# Patient Record
Sex: FEMALE | Race: ASIAN | NOT HISPANIC OR LATINO | URBAN - METROPOLITAN AREA
[De-identification: names, ages, dates, MRNs, and addresses within clinical notes are randomized per-mention and may not be internally consistent; named-entity substitution may affect disease eponyms.]

---

## 2019-05-23 ENCOUNTER — NEW PATIENT (OUTPATIENT)
Dept: URBAN - METROPOLITAN AREA CLINIC 30 | Facility: CLINIC | Age: 74
End: 2019-05-23
Payer: MEDICARE

## 2019-05-23 PROCEDURE — 92015 DETERMINE REFRACTIVE STATE: CPT | Performed by: OPTOMETRIST

## 2019-05-23 PROCEDURE — 92004 COMPRE OPH EXAM NEW PT 1/>: CPT | Performed by: OPTOMETRIST

## 2019-05-23 PROCEDURE — 92134 CPTRZ OPH DX IMG PST SGM RTA: CPT | Performed by: OPTOMETRIST

## 2019-05-23 ASSESSMENT — INTRAOCULAR PRESSURE
OD: 14
OS: 13

## 2019-05-23 ASSESSMENT — VISUAL ACUITY
OS: 20/30
OD: 20/25

## 2019-05-23 ASSESSMENT — KERATOMETRY
OD: 42.19
OS: 42.27

## 2020-11-19 ENCOUNTER — FOLLOW UP ESTABLISHED (OUTPATIENT)
Dept: URBAN - METROPOLITAN AREA CLINIC 30 | Facility: CLINIC | Age: 75
End: 2020-11-19
Payer: MEDICARE

## 2020-11-19 DIAGNOSIS — E11.9 TYPE 2 DIABETES MELLITUS WITHOUT COMPLICATIONS: ICD-10-CM

## 2020-11-19 DIAGNOSIS — Z79.84 COMBINED FORMS OF AGE-RELATED CATARACT, BILATERAL: ICD-10-CM

## 2020-11-19 PROCEDURE — 92014 COMPRE OPH EXAM EST PT 1/>: CPT | Performed by: OPTOMETRIST

## 2020-11-19 PROCEDURE — 92250 FUNDUS PHOTOGRAPHY W/I&R: CPT | Performed by: OPTOMETRIST

## 2020-11-19 ASSESSMENT — VISUAL ACUITY
OS: 20/30
OD: 20/CF 4'

## 2020-11-19 ASSESSMENT — INTRAOCULAR PRESSURE
OD: 15
OS: 17

## 2020-11-19 ASSESSMENT — KERATOMETRY
OS: 42.22
OD: 41.92

## 2020-11-24 ENCOUNTER — NEW PATIENT (OUTPATIENT)
Dept: URBAN - METROPOLITAN AREA CLINIC 10 | Facility: CLINIC | Age: 75
End: 2020-11-24
Payer: MEDICARE

## 2020-11-24 PROCEDURE — 67028 INJECTION EYE DRUG: CPT | Performed by: OPHTHALMOLOGY

## 2020-11-24 PROCEDURE — 92004 COMPRE OPH EXAM NEW PT 1/>: CPT | Performed by: OPHTHALMOLOGY

## 2020-11-24 ASSESSMENT — INTRAOCULAR PRESSURE
OD: 12
OS: 15

## 2020-12-22 ENCOUNTER — FOLLOW UP ESTABLISHED (OUTPATIENT)
Dept: URBAN - METROPOLITAN AREA CLINIC 10 | Facility: CLINIC | Age: 75
End: 2020-12-22
Payer: MEDICARE

## 2020-12-22 PROCEDURE — 67028 INJECTION EYE DRUG: CPT | Performed by: OPHTHALMOLOGY

## 2020-12-22 PROCEDURE — 92250 FUNDUS PHOTOGRAPHY W/I&R: CPT | Performed by: OPHTHALMOLOGY

## 2020-12-22 PROCEDURE — 92242 FLUORESCEIN&ICG ANGIOGRAPHY: CPT | Performed by: OPHTHALMOLOGY

## 2020-12-22 ASSESSMENT — INTRAOCULAR PRESSURE
OS: 12
OD: 11

## 2021-01-25 ENCOUNTER — FOLLOW UP ESTABLISHED (OUTPATIENT)
Dept: URBAN - METROPOLITAN AREA CLINIC 30 | Facility: CLINIC | Age: 76
End: 2021-01-25
Payer: MEDICARE

## 2021-01-25 PROCEDURE — 92134 CPTRZ OPH DX IMG PST SGM RTA: CPT | Performed by: OPHTHALMOLOGY

## 2021-01-25 PROCEDURE — 67028 INJECTION EYE DRUG: CPT | Performed by: OPHTHALMOLOGY

## 2021-01-25 ASSESSMENT — INTRAOCULAR PRESSURE
OD: 15
OS: 14

## 2021-03-08 ENCOUNTER — FOLLOW UP ESTABLISHED (OUTPATIENT)
Dept: URBAN - METROPOLITAN AREA CLINIC 30 | Facility: CLINIC | Age: 76
End: 2021-03-08
Payer: MEDICARE

## 2021-03-08 PROCEDURE — 99214 OFFICE O/P EST MOD 30 MIN: CPT | Performed by: OPHTHALMOLOGY

## 2021-03-08 PROCEDURE — 92134 CPTRZ OPH DX IMG PST SGM RTA: CPT | Performed by: OPHTHALMOLOGY

## 2021-03-08 PROCEDURE — 67515 INJECT/TREAT EYE SOCKET: CPT | Performed by: OPHTHALMOLOGY

## 2021-03-08 ASSESSMENT — INTRAOCULAR PRESSURE
OS: 23
OD: 21

## 2021-04-19 ENCOUNTER — OFFICE VISIT (OUTPATIENT)
Dept: URBAN - METROPOLITAN AREA CLINIC 30 | Facility: CLINIC | Age: 76
End: 2021-04-19
Payer: MEDICARE

## 2021-04-19 PROCEDURE — 92235 FLUORESCEIN ANGRPH MLTIFRAME: CPT | Performed by: OPHTHALMOLOGY

## 2021-04-19 PROCEDURE — 67028 INJECTION EYE DRUG: CPT | Performed by: OPHTHALMOLOGY

## 2021-04-19 PROCEDURE — 92134 CPTRZ OPH DX IMG PST SGM RTA: CPT | Performed by: OPHTHALMOLOGY

## 2021-04-19 PROCEDURE — 92250 FUNDUS PHOTOGRAPHY W/I&R: CPT | Performed by: OPHTHALMOLOGY

## 2021-04-19 ASSESSMENT — INTRAOCULAR PRESSURE
OD: 15
OS: 15

## 2021-04-19 NOTE — IMPRESSION/PLAN
Impression: Central  vein occl w CME OD - CRVO massive HMG / CME -- [facility] loss Nov '20 - injx imprv'd Plan: hx: [[Ischemic CRVO. SEVERE CRVO OD - massive HMG / CME --[facility] loss Nov '20  HMG/fluid - imprv'd w injx]]. URGED BP care. TODAY Avstn OD inj (proc note)     RTC 6w ND/inj/OCT plan Avastin OD    Future  Exam ?

## 2021-06-07 ENCOUNTER — OFFICE VISIT (OUTPATIENT)
Dept: URBAN - METROPOLITAN AREA CLINIC 24 | Facility: CLINIC | Age: 76
End: 2021-06-07
Payer: MEDICARE

## 2021-06-07 PROCEDURE — 92134 CPTRZ OPH DX IMG PST SGM RTA: CPT | Performed by: OPHTHALMOLOGY

## 2021-06-07 PROCEDURE — 67028 INJECTION EYE DRUG: CPT | Performed by: OPHTHALMOLOGY

## 2021-06-07 ASSESSMENT — INTRAOCULAR PRESSURE
OS: 18
OD: 19

## 2021-06-07 NOTE — IMPRESSION/PLAN
Impression: Central  vein occl w CME OD - CRVO massive HMG / CME -- [facility] loss Nov '20 - injx imprv'd Plan: hx: [[Ischemic CRVO. SEVERE CRVO OD - massive HMG / CME --[facility] loss Nov '20  HMG/fluid - imprv'd w injx --  URGED BP care]]. TODAY Avstn OD inj (proc note) RTC 6-7w dil, OCT, eval - h/o Avastin OD    Future HRA?

## 2021-06-11 ENCOUNTER — OFFICE VISIT (OUTPATIENT)
Dept: URBAN - METROPOLITAN AREA CLINIC 30 | Facility: CLINIC | Age: 76
End: 2021-06-11
Payer: MEDICARE

## 2021-06-11 DIAGNOSIS — H25.813 COMBINED FORMS OF AGE-RELATED CATARACT, BILATERAL: ICD-10-CM

## 2021-06-11 DIAGNOSIS — H40.033 ANATOMICAL NARROW ANGLE, BILATERAL: ICD-10-CM

## 2021-06-11 DIAGNOSIS — H34.8110 CENTRAL RETINAL VEIN OCCLUSION, RIGHT EYE, WITH MACULAR EDEMA: ICD-10-CM

## 2021-06-11 PROCEDURE — 92014 COMPRE OPH EXAM EST PT 1/>: CPT | Performed by: OPTOMETRIST

## 2021-06-11 ASSESSMENT — INTRAOCULAR PRESSURE
OS: 18
OD: 17

## 2021-06-11 ASSESSMENT — KERATOMETRY
OS: 42.45
OD: 42.08

## 2021-06-11 ASSESSMENT — VISUAL ACUITY
OS: 20/40
OD: 20/60

## 2021-06-11 NOTE — IMPRESSION/PLAN
Impression: Diabetes mellitus without complications Plan: Diabetes type II: no background retinopathy. Hx of CRVO OD related to HTN. Emphasized importance of strict BS control, diet, exercise, and BP control to avoid risk of loss of vision. Current A1C unknown per pt.

## 2021-06-11 NOTE — IMPRESSION/PLAN
Impression: Hypertensive retinopathy, bilateral: H35.033. Plan: ALWAYS check BP. Stable today. Under care of PCP.

## 2021-06-11 NOTE — IMPRESSION/PLAN
Impression: Central  vein occl w CME OD - CRVO massive HMG / CME -- [facility] loss Nov '20 - injx imprv'd Plan: hx: [[Ischemic CRVO. SEVERE CRVO OD - massive HMG / CME --[facility] loss Nov '20  HMG/fluid - imprv'd w injx --  URGED BP care]]. Under care of Dr Cesario Contreras. Ongoing Avastin injections. Fundus appears stable. Follow up as sched. BCVA limited.

## 2021-07-26 ENCOUNTER — OFFICE VISIT (OUTPATIENT)
Dept: URBAN - METROPOLITAN AREA CLINIC 30 | Facility: CLINIC | Age: 76
End: 2021-07-26
Payer: MEDICARE

## 2021-07-26 PROCEDURE — 99214 OFFICE O/P EST MOD 30 MIN: CPT | Performed by: OPHTHALMOLOGY

## 2021-07-26 PROCEDURE — 67028 INJECTION EYE DRUG: CPT | Performed by: OPHTHALMOLOGY

## 2021-07-26 PROCEDURE — 92134 CPTRZ OPH DX IMG PST SGM RTA: CPT | Performed by: OPHTHALMOLOGY

## 2021-07-26 ASSESSMENT — INTRAOCULAR PRESSURE
OD: 19
OS: 20

## 2021-07-26 NOTE — IMPRESSION/PLAN
Impression: Hypertensive retinopathy HTN Plan: ALWAYS check BP   - HTN is a concern.   URGED efforts / lower salt / counseled pt
  TODAY exam shows HTN chng OS but no hmg

## 2021-07-26 NOTE — IMPRESSION/PLAN
Impression: Central CRVO w CME OD -Massive HMG/CME x Nov '20 - inj imprv'd Plan: hx: [[Ischemic CRVO. SEVERE CRVO OD - massive HMG / CME --[facility] loss Nov '20  HMG/fluid - imprv'd w injx --  URGED BP care]]. TODAY Avstn OD inj (proc note) RTC 8-9w extend / FA update and plan Avastin OD    Future ND?

## 2021-10-05 ENCOUNTER — OFFICE VISIT (OUTPATIENT)
Dept: URBAN - METROPOLITAN AREA CLINIC 10 | Facility: CLINIC | Age: 76
End: 2021-10-05
Payer: MEDICARE

## 2021-10-05 DIAGNOSIS — H35.033 HYPERTENSIVE RETINOPATHY, BILATERAL: ICD-10-CM

## 2021-10-05 PROCEDURE — 92250 FUNDUS PHOTOGRAPHY W/I&R: CPT | Performed by: OPHTHALMOLOGY

## 2021-10-05 PROCEDURE — 67028 INJECTION EYE DRUG: CPT | Performed by: OPHTHALMOLOGY

## 2021-10-05 PROCEDURE — 92235 FLUORESCEIN ANGRPH MLTIFRAME: CPT | Performed by: OPHTHALMOLOGY

## 2021-10-05 PROCEDURE — 92134 CPTRZ OPH DX IMG PST SGM RTA: CPT | Performed by: OPHTHALMOLOGY

## 2021-10-05 ASSESSMENT — INTRAOCULAR PRESSURE
OD: 19
OS: 17

## 2021-10-05 NOTE — IMPRESSION/PLAN
Impression: Central CRVO w CME OD -Massive HMG/CME x Nov '20 - inj imprv'd Plan: hx: [[Ischemic CRVO. SEVERE CRVO OD - massive HMG / CME --[facility] loss Nov '20  HMG/fluid - imprv'd w injx --  URGED BP care]]. TODAY Avstn OD inj (proc note)      RTC 8-9w extend / ND/inj/pos OCT - plan Avastin OD    Future  exam?

## 2021-10-05 NOTE — IMPRESSION/PLAN
Impression: Hypertensive retinopathy HTN Plan: ALWAYS check BP   - HTN is a concern.   URGED efforts / lower salt / counseled pt
  TODAY FA reveals HTN chng OS but no hmg

## 2021-12-13 ENCOUNTER — OFFICE VISIT (OUTPATIENT)
Dept: URBAN - METROPOLITAN AREA CLINIC 30 | Facility: CLINIC | Age: 76
End: 2021-12-13
Payer: MEDICARE

## 2021-12-13 PROCEDURE — 92134 CPTRZ OPH DX IMG PST SGM RTA: CPT | Performed by: OPHTHALMOLOGY

## 2021-12-13 PROCEDURE — 67028 INJECTION EYE DRUG: CPT | Performed by: OPHTHALMOLOGY

## 2021-12-13 ASSESSMENT — INTRAOCULAR PRESSURE
OD: 18
OS: 19

## 2021-12-13 NOTE — IMPRESSION/PLAN
Impression: Central CRVO w CME OD -Massive HMG/CME x Nov '20 - inj imprv'd Plan: hx: [[Ischemic CRVO. SEVERE CRVO OD - massive HMG / CME --[facility] loss Nov '20  HMG/fluid - imprv'd w injx --  URGED BP care]]. TODAY Avstn OD inj (proc note) RTC 8-9w extend /  dil, OCT, eval - h/o  Avastin OD    Future  HRA?

## 2022-02-07 ENCOUNTER — OFFICE VISIT (OUTPATIENT)
Dept: URBAN - METROPOLITAN AREA CLINIC 30 | Facility: CLINIC | Age: 77
End: 2022-02-07
Payer: MEDICARE

## 2022-02-07 DIAGNOSIS — H25.13 AGE-RELATED NUCLEAR CATARACT, BILATERAL: ICD-10-CM

## 2022-02-07 PROCEDURE — 99214 OFFICE O/P EST MOD 30 MIN: CPT | Performed by: OPHTHALMOLOGY

## 2022-02-07 PROCEDURE — 92134 CPTRZ OPH DX IMG PST SGM RTA: CPT | Performed by: OPHTHALMOLOGY

## 2022-02-07 PROCEDURE — 67028 INJECTION EYE DRUG: CPT | Performed by: OPHTHALMOLOGY

## 2022-02-07 ASSESSMENT — INTRAOCULAR PRESSURE
OS: 11
OD: 14

## 2022-02-07 NOTE — IMPRESSION/PLAN
Impression: Hypertensive retinopathy HTN Plan:   TODAY exam shows HTN chng OS but no hmg 
  ALWAYS check BP   - HTN is a concern. URGED efforts / lower salt / d/w'd  pt

## 2022-02-07 NOTE — IMPRESSION/PLAN
Impression: Central CRVO w CME OD -Massive HMG/CME x Nov '20 - inj imprv'd Plan: hx: [[Ischemic CRVO. SEVERE CRVO OD - massive HMG / CME --[facility] loss Nov '20  HMG/fluid - imprv'd w injx --  URGED BP care]]. TODAY Avstn OD inj (proc note) RTC 8-9w extend Pos HRA / plan Avastin OD    Future  ND?

## 2022-04-04 ENCOUNTER — OFFICE VISIT (OUTPATIENT)
Dept: URBAN - METROPOLITAN AREA CLINIC 30 | Facility: CLINIC | Age: 77
End: 2022-04-04
Payer: MEDICARE

## 2022-04-04 PROCEDURE — 92250 FUNDUS PHOTOGRAPHY W/I&R: CPT | Performed by: OPHTHALMOLOGY

## 2022-04-04 PROCEDURE — 92242 FLUORESCEIN&ICG ANGIOGRAPHY: CPT | Performed by: OPHTHALMOLOGY

## 2022-04-04 PROCEDURE — 67028 INJECTION EYE DRUG: CPT | Performed by: OPHTHALMOLOGY

## 2022-04-04 PROCEDURE — 92134 CPTRZ OPH DX IMG PST SGM RTA: CPT | Performed by: OPHTHALMOLOGY

## 2022-04-04 ASSESSMENT — INTRAOCULAR PRESSURE
OS: 21
OD: 20

## 2022-04-04 NOTE — IMPRESSION/PLAN
Impression: Central CRVO w CME OD -Massive HMG/CME x Nov '20 - inj imprv'd Plan: hx: [[Ischemic CRVO. SEVERE CRVO OD - massive HMG / CME --[facility] loss Nov '20  HMG/fluid - imprv'd w injx --  URGED BP care]]. TODAY Avstn OD inj (proc note)      RTC 8-9w extend ND/injx/OCT  plan Avastin OD    Future  Exam?

## 2022-06-06 ENCOUNTER — OFFICE VISIT (OUTPATIENT)
Dept: URBAN - METROPOLITAN AREA CLINIC 24 | Facility: CLINIC | Age: 77
End: 2022-06-06
Payer: MEDICARE

## 2022-06-06 DIAGNOSIS — H34.8110 CENTRAL RETINAL VEIN OCCLUSION, RIGHT EYE, WITH MACULAR EDEMA: Primary | ICD-10-CM

## 2022-06-06 PROCEDURE — 67028 INJECTION EYE DRUG: CPT | Performed by: OPHTHALMOLOGY

## 2022-06-06 PROCEDURE — 92134 CPTRZ OPH DX IMG PST SGM RTA: CPT | Performed by: OPHTHALMOLOGY

## 2022-06-06 ASSESSMENT — INTRAOCULAR PRESSURE
OS: 15
OD: 15

## 2022-06-06 NOTE — IMPRESSION/PLAN
Impression: Central CRVO w CME OD -Massive HMG/CME x Nov '20 - inj imprv'd Plan: hx: [[Ischemic CRVO. SEVERE CRVO OD - massive HMG / CME --[facility] loss Nov '20  HMG/fluid - imprv'd w injx --  URGED BP care]]. TODAY Avstn OD inj (proc note) RTC 8-9w extend dil, OCT, eval - h/o Avastin OD    Future  HRA? 
    Daughter Sarmad Hill sometimes there from [place] to help her mom [place]

## 2022-08-08 ENCOUNTER — OFFICE VISIT (OUTPATIENT)
Dept: URBAN - METROPOLITAN AREA CLINIC 30 | Facility: CLINIC | Age: 77
End: 2022-08-08
Payer: MEDICARE

## 2022-08-08 DIAGNOSIS — H35.033 HYPERTENSIVE RETINOPATHY, BILATERAL: ICD-10-CM

## 2022-08-08 DIAGNOSIS — H34.8110 CENTRAL RETINAL VEIN OCCLUSION, RIGHT EYE, WITH MACULAR EDEMA: Primary | ICD-10-CM

## 2022-08-08 PROCEDURE — 67028 INJECTION EYE DRUG: CPT | Performed by: OPHTHALMOLOGY

## 2022-08-08 PROCEDURE — 92134 CPTRZ OPH DX IMG PST SGM RTA: CPT | Performed by: OPHTHALMOLOGY

## 2022-08-08 PROCEDURE — 99214 OFFICE O/P EST MOD 30 MIN: CPT | Performed by: OPHTHALMOLOGY

## 2022-08-08 ASSESSMENT — INTRAOCULAR PRESSURE
OS: 20
OD: 18

## 2022-08-08 NOTE — IMPRESSION/PLAN
Impression: Central CRVO w CME OD -Massive HMG/CME x Nov '20 - inj imprv'd Plan: hx: [[Ischemic CRVO. SEVERE CRVO OD - massive HMG / CME --[facility] loss Nov '20  HMG/fluid - imprv'd w injx --  URGED BP care]]. TODAY Avstn OD inj (proc note) RTC 8-9w extend pos HRA / plan Avastin OD    Future ND?  
    Daughter Brooke Winter sometimes there from [place] to help her mom [place]

## 2022-08-08 NOTE — IMPRESSION/PLAN
Impression: Hypertensive retinopathy HTN Plan: TODAY repeated exam shows HTN chng OS but no hmg 
  ALWAYS check BP   - HTN is a concern.   URGED efforts / lower salt / d/w'd  pt

## 2022-10-03 ENCOUNTER — OFFICE VISIT (OUTPATIENT)
Dept: URBAN - METROPOLITAN AREA CLINIC 30 | Facility: CLINIC | Age: 77
End: 2022-10-03
Payer: MEDICARE

## 2022-10-03 DIAGNOSIS — H34.8110 CENTRAL RETINAL VEIN OCCLUSION, RIGHT EYE, WITH MACULAR EDEMA: Primary | ICD-10-CM

## 2022-10-03 DIAGNOSIS — H35.033 HYPERTENSIVE RETINOPATHY, BILATERAL: ICD-10-CM

## 2022-10-03 PROCEDURE — 67028 INJECTION EYE DRUG: CPT | Performed by: OPHTHALMOLOGY

## 2022-10-03 PROCEDURE — 92134 CPTRZ OPH DX IMG PST SGM RTA: CPT | Performed by: OPHTHALMOLOGY

## 2022-10-03 PROCEDURE — 92242 FLUORESCEIN&ICG ANGIOGRAPHY: CPT | Performed by: OPHTHALMOLOGY

## 2022-10-03 PROCEDURE — 92250 FUNDUS PHOTOGRAPHY W/I&R: CPT | Performed by: OPHTHALMOLOGY

## 2022-10-03 ASSESSMENT — INTRAOCULAR PRESSURE
OD: 13
OS: 15

## 2022-10-03 NOTE — IMPRESSION/PLAN
Impression: Hypertensive retinopathy HTN Plan: TODAY repeated FA w HTN chng OS but no hmg 
  ALWAYS check BP   - HTN is a concern.   URGED efforts / lower salt / d/w'd  pt

## 2022-10-03 NOTE — IMPRESSION/PLAN
Impression: Central CRVO w CME OD -Massive HMG/CME x Nov '20 - inj imprv'd Plan: hx: [[Ischemic CRVO. SEVERE CRVO OD - massive HMG / CME --[facility] loss Nov '20  HMG/fluid - imprv'd w injx --  URGED BP care]]. TODAY Avstn OD inj (proc note)      RTC 8-10w extend ND/inj/pos OCT - plan Avastin OD    Future exam?  
    Daughter RAZIA sometimes there from [place] to help her mom [place]

## 2023-05-18 ENCOUNTER — OFFICE VISIT (OUTPATIENT)
Dept: URBAN - METROPOLITAN AREA CLINIC 30 | Facility: CLINIC | Age: 78
End: 2023-05-18
Payer: MEDICARE

## 2023-05-18 DIAGNOSIS — H25.13 AGE-RELATED NUCLEAR CATARACT, BILATERAL: ICD-10-CM

## 2023-05-18 DIAGNOSIS — H40.023 OPEN ANGLE WITH BORDERLINE FINDINGS, HIGH RISK, BILATERAL: Primary | ICD-10-CM

## 2023-05-18 DIAGNOSIS — H34.8110 CENTRAL RETINAL VEIN OCCLUSION, RIGHT EYE, WITH MACULAR EDEMA: ICD-10-CM

## 2023-05-18 PROCEDURE — 99213 OFFICE O/P EST LOW 20 MIN: CPT

## 2023-05-18 PROCEDURE — 92020 GONIOSCOPY: CPT

## 2023-05-18 PROCEDURE — 92133 CPTRZD OPH DX IMG PST SGM ON: CPT

## 2023-05-18 PROCEDURE — 92083 EXTENDED VISUAL FIELD XM: CPT

## 2023-05-18 ASSESSMENT — INTRAOCULAR PRESSURE
OD: 20
OS: 21

## 2023-05-18 NOTE — IMPRESSION/PLAN
Impression: Central CRVO w CME OD -Massive HMG/CME x Nov '20 - inj improved Plan: hx: [[Ischemic CRVO. SEVERE CRVO OD - massive HMG / CME --[facility] loss Nov '20  HMG/fluid - imprv'd w injx --  URGED BP care]] Last HEATH 10/3/22 with [facility]. Got off cycle around 11/2022 - will f/u as scheduled with him. VA remains stable, no recurrence of fluid on OCT.

## 2023-05-18 NOTE — IMPRESSION/PLAN
Impression: Age-related nuclear cataract, bilateral: H25.13. Plan: VS, pt noting more issues with OD recently. Will re-establish care with [facility] next mo. Pending that apt, can consider to proceed with cataract evaluation and schedule surgery.

## 2023-05-18 NOTE — IMPRESSION/PLAN
Impression: Open angle with borderline findings, high risk, bilateral: H40.023. Plan: GS OD>>OS due to h/o CRVO OD, borderline IOP, OCT RNFL appearance. OCT RNFL 3/17/23: poor scan sup but diffuse inferior loss OD, tr IT thinning OS. Today, IOP 20/21 on no gtts OCT RNFL: OD IT loss 67, poor scan sup || OS poor scan Fundus photos taken today 10-2 HVF (intended to be 24-2): OD high FL, inferior misses || OS high FL, few inf misses. Gonio: no NVA, open OU PLAN:
Monitor w/o tx for change. If e/o progression on RNFL, would consider addition of gtts.  F/u 3 mo [facility] IOP dilate OCT RNFL/GCC + cat eval.

## 2023-06-12 ENCOUNTER — OFFICE VISIT (OUTPATIENT)
Dept: URBAN - METROPOLITAN AREA CLINIC 30 | Facility: CLINIC | Age: 78
End: 2023-06-12
Payer: MEDICARE

## 2023-06-12 DIAGNOSIS — H25.13 AGE-RELATED NUCLEAR CATARACT, BILATERAL: ICD-10-CM

## 2023-06-12 DIAGNOSIS — H34.8110 CENTRAL RETINAL VEIN OCCLUSION, RIGHT EYE, WITH MACULAR EDEMA: Primary | ICD-10-CM

## 2023-06-12 DIAGNOSIS — H35.033 HYPERTENSIVE RETINOPATHY, BILATERAL: ICD-10-CM

## 2023-06-12 PROCEDURE — 92250 FUNDUS PHOTOGRAPHY W/I&R: CPT | Performed by: OPHTHALMOLOGY

## 2023-06-12 PROCEDURE — 92134 CPTRZ OPH DX IMG PST SGM RTA: CPT | Performed by: OPHTHALMOLOGY

## 2023-06-12 PROCEDURE — 99214 OFFICE O/P EST MOD 30 MIN: CPT | Performed by: OPHTHALMOLOGY

## 2023-06-12 ASSESSMENT — INTRAOCULAR PRESSURE
OD: 17
OS: 16

## 2023-06-12 NOTE — IMPRESSION/PLAN
Impression: Central CRVO w CME OD -Massive HMG/CME x Nov '20 - inj imprv'd  (Sweet / grateful) Plan: hx: [[Ischemic CRVO. SEVERE CRVO OD - massive HMG / CME --[facility] loss Nov '20  HMG/fluid - imprv'd w injx --  URGED BP care]]. TODAY NO INJX / ATROPHY now OD w NO RECUR HMG '22-23 RTC q6mo -- COLORS / EXAM -- SUSPEND injx OD / monitor OS     Daughter Chata Alonzo sometimes there from [place] to help her mom [place]

## 2023-06-12 NOTE — IMPRESSION/PLAN
Impression: Hypertensive retinopathy HTN Plan: TODAY repeated Exam shows HTN chng OS but no hmg  / no occl. OS 
  ALWAYS check BP   - HTN is a concern.   URGED efforts / lower salt / d/w'd  pt

## 2023-06-12 NOTE — IMPRESSION/PLAN
Impression: Cataract, bilateral: H25.13. Plan:  pt noting more issues with VA  recently. Will re-establish care w [facility] for Mrx v. cataract evaluation in the near future.

## 2023-12-11 ENCOUNTER — OFFICE VISIT (OUTPATIENT)
Dept: URBAN - METROPOLITAN AREA CLINIC 30 | Facility: CLINIC | Age: 78
End: 2023-12-11
Payer: MEDICARE

## 2023-12-11 DIAGNOSIS — H34.8110 CENTRAL RETINAL VEIN OCCLUSION, RIGHT EYE, WITH MACULAR EDEMA: Primary | ICD-10-CM

## 2023-12-11 DIAGNOSIS — H35.033 HYPERTENSIVE RETINOPATHY, BILATERAL: ICD-10-CM

## 2023-12-11 PROCEDURE — 99214 OFFICE O/P EST MOD 30 MIN: CPT | Performed by: OPHTHALMOLOGY

## 2023-12-11 PROCEDURE — 92134 CPTRZ OPH DX IMG PST SGM RTA: CPT | Performed by: OPHTHALMOLOGY

## 2023-12-11 ASSESSMENT — INTRAOCULAR PRESSURE
OD: 16
OS: 17

## 2025-07-02 ENCOUNTER — OFFICE VISIT (OUTPATIENT)
Dept: URBAN - METROPOLITAN AREA CLINIC 30 | Facility: CLINIC | Age: 80
End: 2025-07-02
Payer: MEDICARE

## 2025-07-02 DIAGNOSIS — H40.023 OPEN ANGLE WITH BORDERLINE FINDINGS, HIGH RISK, BILATERAL: ICD-10-CM

## 2025-07-02 DIAGNOSIS — E11.9 TYPE 2 DIABETES MELLITUS WITHOUT COMPLICATIONS: Primary | ICD-10-CM

## 2025-07-02 DIAGNOSIS — H34.8110 CENTRAL RETINAL VEIN OCCLUSION, RIGHT EYE, WITH MACULAR EDEMA: ICD-10-CM

## 2025-07-02 DIAGNOSIS — H25.13 AGE-RELATED NUCLEAR CATARACT, BILATERAL: ICD-10-CM

## 2025-07-02 PROCEDURE — 92250 FUNDUS PHOTOGRAPHY W/I&R: CPT

## 2025-07-02 PROCEDURE — 92134 CPTRZ OPH DX IMG PST SGM RTA: CPT

## 2025-07-02 PROCEDURE — 99214 OFFICE O/P EST MOD 30 MIN: CPT

## 2025-07-02 ASSESSMENT — KERATOMETRY
OD: 42.13
OS: 42.63

## 2025-07-02 ASSESSMENT — INTRAOCULAR PRESSURE
OD: 17
OS: 15

## 2025-07-02 ASSESSMENT — VISUAL ACUITY
OD: 20/80
OS: 20/40